# Patient Record
Sex: MALE | Race: OTHER | HISPANIC OR LATINO | URBAN - METROPOLITAN AREA
[De-identification: names, ages, dates, MRNs, and addresses within clinical notes are randomized per-mention and may not be internally consistent; named-entity substitution may affect disease eponyms.]

---

## 2021-07-27 VITALS
HEIGHT: 68 IN | RESPIRATION RATE: 15 BRPM | HEART RATE: 84 BPM | DIASTOLIC BLOOD PRESSURE: 79 MMHG | SYSTOLIC BLOOD PRESSURE: 144 MMHG | TEMPERATURE: 98 F | WEIGHT: 315 LBS | OXYGEN SATURATION: 98 %

## 2021-07-27 RX ORDER — LISINOPRIL/HYDROCHLOROTHIAZIDE 10-12.5 MG
1 TABLET ORAL
Qty: 0 | Refills: 0 | DISCHARGE

## 2021-07-27 RX ORDER — MELOXICAM 15 MG/1
1 TABLET ORAL
Qty: 0 | Refills: 0 | DISCHARGE

## 2021-07-27 RX ORDER — ASPIRIN/CALCIUM CARB/MAGNESIUM 324 MG
1 TABLET ORAL
Qty: 0 | Refills: 0 | DISCHARGE

## 2021-07-27 RX ORDER — DAPAGLIFLOZIN 10 MG/1
1 TABLET, FILM COATED ORAL
Qty: 0 | Refills: 0 | DISCHARGE

## 2021-07-27 RX ORDER — CHLORHEXIDINE GLUCONATE 213 G/1000ML
1 SOLUTION TOPICAL ONCE
Refills: 0 | Status: DISCONTINUED | OUTPATIENT
Start: 2021-07-30 | End: 2021-07-30

## 2021-07-27 RX ORDER — ATORVASTATIN CALCIUM 80 MG/1
1 TABLET, FILM COATED ORAL
Qty: 0 | Refills: 0 | DISCHARGE

## 2021-07-27 RX ORDER — SITAGLIPTIN 50 MG/1
1 TABLET, FILM COATED ORAL
Qty: 0 | Refills: 0 | DISCHARGE

## 2021-07-27 NOTE — H&P ADULT - HISTORY OF PRESENT ILLNESS
Cardiologist: Dr. Perry Levine  Escort:  Pharmacy: [facility] [phone]  COVID: Vaccinated x2 – Moderna (last dose 4/27/21)  *Verify Meds*  52 yo M with a PMH of HTN, HLD, DM, obesity, PVD w/ b/l claudication, OA in b/l knees, who presented to outpatient cardiologist Dr. Perry Levine with complaints of substernal, intermittent non-radiating chest pressure on exertion of ____ blocks over the past few weeks relieved with rest. CP is associated with EDMONDS and dizziness. Denies diaphoresis, palpitations, orthopnea/PND, abdominal pain, N/V/D, urinary sx, BRBPR, hematuria, melena, LE swelling, recent travel/sick contacts/illness. NSR 2/7/2021: moderate perfusion defect of moderate intensity in the inferior and apical regions on the stress which was reversible with rest, post stress imaging revealed LVEF 39% w/ global hypokinesis. CCTA 4/2021 (per MD note): CA score 1044 with moderate diffuse stenosis in pLAD and RCA. Echocardiogram 1/16/21: LVEF 72%, harrison MR/TR, aortic root dilation 4.3cm. In light of patient’s risk factors, CCS angina class III sx and abnormal NST/CCTA, patient is referred for cardiac catheterization with possible intervention if clinically indicated.   Cardiologist: Dr. Perry Levine  Escort:  Pharmacy: [facility] [phone]  COVID: Vaccinated x2 – Moderna (last dose 4/27/21)  52 yo M with a PMH of HTN, HLD, DM, obesity, PVD w/ b/l claudication, OA in b/l knees, who presented to outpatient cardiologist Dr. Perry Levine and per MD's note, pt with complaints of substernal, intermittent non-radiating chest pressure on exertion of blocks over the past few weeks relieved with rest. CP is associated with EDMONDS and dizziness. Denies diaphoresis, palpitations, orthopnea/PND, abdominal pain, N/V/D, urinary sx, BRBPR, hematuria, melena, LE swelling, recent travel/sick contacts/illness. NST 2/7/2021: moderate perfusion defect of moderate intensity in the inferior and apical regions on the stress which was reversible with rest, post stress imaging revealed LVEF 39% w/ global hypokinesis. CCTA 4/2021 (per MD note): CA score 1044 with moderate diffuse stenosis in pLAD and RCA. Echocardiogram 1/16/21: LVEF 72%, harrison MR/TR, aortic root dilation 4.3cm. In light of patient’s risk factors, CCS angina class III sx and abnormal NST/CCTA, patient is referred for cardiac catheterization with possible intervention if clinically indicated.

## 2021-07-27 NOTE — H&P ADULT - ASSESSMENT
50 yo M with a PMH of HTN, HLD, DM, obesity, PVD w/ b/l claudication, OA in b/l knees, who presented to outpatient cardiologist Dr. Perry Levine and per MD's note, pt with complaints Class III Anginal symptoms with subsequent NST 2/7/2021: moderate perfusion defect of moderate intensity in the inferior and apical regions on the stress which was reversible with rest, post stress imaging revealed LVEF 39% w/ global hypokinesis. CCTA 4/2021 (per MD note): CA score 1044 with moderate diffuse stenosis in pLAD and RCA. Echocardiogram 1/16/21: LVEF 72%, harrison MR/TR, aortic root dilation 4.3cm. Given pt's risk factors, CCS angina class III sx and abnormal NST/CCTA, patient is referred for cardiac catheterization with possible intervention if clinically indicated.  EKG: NSR @ 78 bpm, RBBB, no acute ischemic changes				  ASA: III			Mallampati class:II            Anginal Class: 3  Sedation Plan:  Moderate     Patient Is Suitable Candidate For Sedation: Yes    Risks & benefits of procedure and sedation and risks and benefits for the alternative therapy have been explained to the patient in layman’s terms including but not limited to: allergic reaction, bleeding, infection, arrhythmia, respiratory compromise, renal and vascular compromise, limb damage, MI, CVA, emergent CABG/Vascular Surgery and death. Informed consent obtained and in chart.  Of note:  Pt given home maintenance dose ASA 81mg and loaded with Plavix 600mg (H/H stable).   NS @ 50cc given, pt appears mildly SOB when lying flat. Lung with diminished BS likely due to body habitus.

## 2021-07-27 NOTE — H&P ADULT - NSICDXPASTMEDICALHX_GEN_ALL_CORE_FT
PAST MEDICAL HISTORY:  DM (diabetes mellitus)     HLD (hyperlipidemia)     HTN (hypertension)     PAD (peripheral artery disease)

## 2021-07-30 ENCOUNTER — OUTPATIENT (OUTPATIENT)
Dept: OUTPATIENT SERVICES | Facility: HOSPITAL | Age: 51
LOS: 1 days | Discharge: NURSING, REHAB OR LONG-TERM CARE | End: 2021-07-30
Payer: COMMERCIAL

## 2021-07-30 LAB
A1C WITH ESTIMATED AVERAGE GLUCOSE RESULT: 6.3 % — HIGH (ref 4–5.6)
ALBUMIN SERPL ELPH-MCNC: 4.2 G/DL — SIGNIFICANT CHANGE UP (ref 3.3–5)
ALP SERPL-CCNC: 81 U/L — SIGNIFICANT CHANGE UP (ref 40–120)
ALT FLD-CCNC: 16 U/L — SIGNIFICANT CHANGE UP (ref 10–45)
ANION GAP SERPL CALC-SCNC: 8 MMOL/L — SIGNIFICANT CHANGE UP (ref 5–17)
ANION GAP SERPL CALC-SCNC: 9 MMOL/L — SIGNIFICANT CHANGE UP (ref 5–17)
APTT BLD: 33.7 SEC — SIGNIFICANT CHANGE UP (ref 27.5–35.5)
APTT BLD: 44.4 SEC — HIGH (ref 27.5–35.5)
AST SERPL-CCNC: 16 U/L — SIGNIFICANT CHANGE UP (ref 10–40)
BASOPHILS # BLD AUTO: 0.07 K/UL — SIGNIFICANT CHANGE UP (ref 0–0.2)
BASOPHILS NFR BLD AUTO: 0.6 % — SIGNIFICANT CHANGE UP (ref 0–2)
BILIRUB SERPL-MCNC: 0.7 MG/DL — SIGNIFICANT CHANGE UP (ref 0.2–1.2)
BUN SERPL-MCNC: 15 MG/DL — SIGNIFICANT CHANGE UP (ref 7–23)
BUN SERPL-MCNC: 16 MG/DL — SIGNIFICANT CHANGE UP (ref 7–23)
CALCIUM SERPL-MCNC: 9.1 MG/DL — SIGNIFICANT CHANGE UP (ref 8.4–10.5)
CALCIUM SERPL-MCNC: 9.2 MG/DL — SIGNIFICANT CHANGE UP (ref 8.4–10.5)
CHLORIDE SERPL-SCNC: 104 MMOL/L — SIGNIFICANT CHANGE UP (ref 96–108)
CHLORIDE SERPL-SCNC: 108 MMOL/L — SIGNIFICANT CHANGE UP (ref 96–108)
CHOLEST SERPL-MCNC: 114 MG/DL — SIGNIFICANT CHANGE UP
CK MB CFR SERPL CALC: 1.7 NG/ML — SIGNIFICANT CHANGE UP (ref 0–6.7)
CK SERPL-CCNC: 112 U/L — SIGNIFICANT CHANGE UP (ref 30–200)
CO2 SERPL-SCNC: 24 MMOL/L — SIGNIFICANT CHANGE UP (ref 22–31)
CO2 SERPL-SCNC: 25 MMOL/L — SIGNIFICANT CHANGE UP (ref 22–31)
CREAT SERPL-MCNC: 1 MG/DL — SIGNIFICANT CHANGE UP (ref 0.5–1.3)
CREAT SERPL-MCNC: 1.05 MG/DL — SIGNIFICANT CHANGE UP (ref 0.5–1.3)
EOSINOPHIL # BLD AUTO: 0.29 K/UL — SIGNIFICANT CHANGE UP (ref 0–0.5)
EOSINOPHIL NFR BLD AUTO: 2.5 % — SIGNIFICANT CHANGE UP (ref 0–6)
ESTIMATED AVERAGE GLUCOSE: 134 MG/DL — HIGH (ref 68–114)
GLUCOSE BLDC GLUCOMTR-MCNC: 121 MG/DL — HIGH (ref 70–99)
GLUCOSE SERPL-MCNC: 115 MG/DL — HIGH (ref 70–99)
GLUCOSE SERPL-MCNC: 188 MG/DL — HIGH (ref 70–99)
HCT VFR BLD CALC: 39 % — SIGNIFICANT CHANGE UP (ref 39–50)
HCT VFR BLD CALC: 39.6 % — SIGNIFICANT CHANGE UP (ref 39–50)
HDLC SERPL-MCNC: 38 MG/DL — LOW
HGB BLD-MCNC: 13.3 G/DL — SIGNIFICANT CHANGE UP (ref 13–17)
HGB BLD-MCNC: 13.5 G/DL — SIGNIFICANT CHANGE UP (ref 13–17)
IMM GRANULOCYTES NFR BLD AUTO: 0.8 % — SIGNIFICANT CHANGE UP (ref 0–1.5)
INR BLD: 1.08 — SIGNIFICANT CHANGE UP (ref 0.88–1.16)
INR BLD: 1.16 — SIGNIFICANT CHANGE UP (ref 0.88–1.16)
LIPID PNL WITH DIRECT LDL SERPL: 47 MG/DL — SIGNIFICANT CHANGE UP
LYMPHOCYTES # BLD AUTO: 2.32 K/UL — SIGNIFICANT CHANGE UP (ref 1–3.3)
LYMPHOCYTES # BLD AUTO: 20.3 % — SIGNIFICANT CHANGE UP (ref 13–44)
MCHC RBC-ENTMCNC: 30.4 PG — SIGNIFICANT CHANGE UP (ref 27–34)
MCHC RBC-ENTMCNC: 31.3 PG — SIGNIFICANT CHANGE UP (ref 27–34)
MCHC RBC-ENTMCNC: 33.6 GM/DL — SIGNIFICANT CHANGE UP (ref 32–36)
MCHC RBC-ENTMCNC: 34.6 GM/DL — SIGNIFICANT CHANGE UP (ref 32–36)
MCV RBC AUTO: 90.3 FL — SIGNIFICANT CHANGE UP (ref 80–100)
MCV RBC AUTO: 90.6 FL — SIGNIFICANT CHANGE UP (ref 80–100)
MONOCYTES # BLD AUTO: 0.98 K/UL — HIGH (ref 0–0.9)
MONOCYTES NFR BLD AUTO: 8.6 % — SIGNIFICANT CHANGE UP (ref 2–14)
NEUTROPHILS # BLD AUTO: 7.66 K/UL — HIGH (ref 1.8–7.4)
NEUTROPHILS NFR BLD AUTO: 67.2 % — SIGNIFICANT CHANGE UP (ref 43–77)
NON HDL CHOLESTEROL: 76 MG/DL — SIGNIFICANT CHANGE UP
NRBC # BLD: 0 /100 WBCS — SIGNIFICANT CHANGE UP (ref 0–0)
NRBC # BLD: 0 /100 WBCS — SIGNIFICANT CHANGE UP (ref 0–0)
PLATELET # BLD AUTO: 261 K/UL — SIGNIFICANT CHANGE UP (ref 150–400)
PLATELET # BLD AUTO: 279 K/UL — SIGNIFICANT CHANGE UP (ref 150–400)
POTASSIUM SERPL-MCNC: 4 MMOL/L — SIGNIFICANT CHANGE UP (ref 3.5–5.3)
POTASSIUM SERPL-MCNC: 4.1 MMOL/L — SIGNIFICANT CHANGE UP (ref 3.5–5.3)
POTASSIUM SERPL-SCNC: 4 MMOL/L — SIGNIFICANT CHANGE UP (ref 3.5–5.3)
POTASSIUM SERPL-SCNC: 4.1 MMOL/L — SIGNIFICANT CHANGE UP (ref 3.5–5.3)
PROT SERPL-MCNC: 7.1 G/DL — SIGNIFICANT CHANGE UP (ref 6–8.3)
PROTHROM AB SERPL-ACNC: 12.9 SEC — SIGNIFICANT CHANGE UP (ref 10.6–13.6)
PROTHROM AB SERPL-ACNC: 13.8 SEC — HIGH (ref 10.6–13.6)
RBC # BLD: 4.32 M/UL — SIGNIFICANT CHANGE UP (ref 4.2–5.8)
RBC # BLD: 4.37 M/UL — SIGNIFICANT CHANGE UP (ref 4.2–5.8)
RBC # FLD: 13.2 % — SIGNIFICANT CHANGE UP (ref 10.3–14.5)
RBC # FLD: 13.5 % — SIGNIFICANT CHANGE UP (ref 10.3–14.5)
SODIUM SERPL-SCNC: 137 MMOL/L — SIGNIFICANT CHANGE UP (ref 135–145)
SODIUM SERPL-SCNC: 141 MMOL/L — SIGNIFICANT CHANGE UP (ref 135–145)
TRIGL SERPL-MCNC: 146 MG/DL — SIGNIFICANT CHANGE UP
WBC # BLD: 11.41 K/UL — HIGH (ref 3.8–10.5)
WBC # BLD: 12.87 K/UL — HIGH (ref 3.8–10.5)
WBC # FLD AUTO: 11.41 K/UL — HIGH (ref 3.8–10.5)
WBC # FLD AUTO: 12.87 K/UL — HIGH (ref 3.8–10.5)

## 2021-07-30 PROCEDURE — 99153 MOD SED SAME PHYS/QHP EA: CPT

## 2021-07-30 PROCEDURE — 82962 GLUCOSE BLOOD TEST: CPT

## 2021-07-30 PROCEDURE — C1725: CPT

## 2021-07-30 PROCEDURE — C1894: CPT

## 2021-07-30 PROCEDURE — 85610 PROTHROMBIN TIME: CPT

## 2021-07-30 PROCEDURE — 82550 ASSAY OF CK (CPK): CPT

## 2021-07-30 PROCEDURE — C1769: CPT

## 2021-07-30 PROCEDURE — 80061 LIPID PANEL: CPT

## 2021-07-30 PROCEDURE — C1874: CPT

## 2021-07-30 PROCEDURE — 85025 COMPLETE CBC W/AUTO DIFF WBC: CPT

## 2021-07-30 PROCEDURE — 85027 COMPLETE CBC AUTOMATED: CPT

## 2021-07-30 PROCEDURE — 93010 ELECTROCARDIOGRAM REPORT: CPT | Mod: 76

## 2021-07-30 PROCEDURE — C1887: CPT

## 2021-07-30 PROCEDURE — 85730 THROMBOPLASTIN TIME PARTIAL: CPT

## 2021-07-30 PROCEDURE — 82553 CREATINE MB FRACTION: CPT

## 2021-07-30 PROCEDURE — 80053 COMPREHEN METABOLIC PANEL: CPT

## 2021-07-30 PROCEDURE — 99152 MOD SED SAME PHYS/QHP 5/>YRS: CPT

## 2021-07-30 PROCEDURE — C1753: CPT

## 2021-07-30 PROCEDURE — 80048 BASIC METABOLIC PNL TOTAL CA: CPT

## 2021-07-30 PROCEDURE — 93005 ELECTROCARDIOGRAM TRACING: CPT

## 2021-07-30 PROCEDURE — 92978 ENDOLUMINL IVUS OCT C 1ST: CPT | Mod: LD

## 2021-07-30 PROCEDURE — 36415 COLL VENOUS BLD VENIPUNCTURE: CPT

## 2021-07-30 PROCEDURE — 93458 L HRT ARTERY/VENTRICLE ANGIO: CPT | Mod: 59

## 2021-07-30 PROCEDURE — C9600: CPT | Mod: LD

## 2021-07-30 PROCEDURE — 83036 HEMOGLOBIN GLYCOSYLATED A1C: CPT

## 2021-07-30 RX ORDER — SODIUM CHLORIDE 9 MG/ML
50 INJECTION INTRAMUSCULAR; INTRAVENOUS; SUBCUTANEOUS
Refills: 0 | Status: DISCONTINUED | OUTPATIENT
Start: 2021-07-30 | End: 2021-07-30

## 2021-07-30 RX ORDER — LISINOPRIL/HYDROCHLOROTHIAZIDE 10-12.5 MG
1 TABLET ORAL
Qty: 30 | Refills: 0
Start: 2021-07-30 | End: 2021-08-28

## 2021-07-30 RX ORDER — ASPIRIN/CALCIUM CARB/MAGNESIUM 324 MG
81 TABLET ORAL ONCE
Refills: 0 | Status: COMPLETED | OUTPATIENT
Start: 2021-07-30 | End: 2021-07-30

## 2021-07-30 RX ORDER — CLOPIDOGREL BISULFATE 75 MG/1
1 TABLET, FILM COATED ORAL
Qty: 30 | Refills: 0
Start: 2021-07-30 | End: 2021-08-28

## 2021-07-30 RX ORDER — ASPIRIN/CALCIUM CARB/MAGNESIUM 324 MG
1 TABLET ORAL
Qty: 30 | Refills: 10
Start: 2021-07-30 | End: 2022-06-24

## 2021-07-30 RX ORDER — SODIUM CHLORIDE 9 MG/ML
500 INJECTION INTRAMUSCULAR; INTRAVENOUS; SUBCUTANEOUS
Refills: 0 | Status: DISCONTINUED | OUTPATIENT
Start: 2021-07-30 | End: 2021-07-30

## 2021-07-30 RX ORDER — CLOPIDOGREL BISULFATE 75 MG/1
600 TABLET, FILM COATED ORAL ONCE
Refills: 0 | Status: COMPLETED | OUTPATIENT
Start: 2021-07-30 | End: 2021-07-30

## 2021-07-30 RX ORDER — HYDRALAZINE HCL 50 MG
5 TABLET ORAL ONCE
Refills: 0 | Status: COMPLETED | OUTPATIENT
Start: 2021-07-30 | End: 2021-07-30

## 2021-07-30 RX ORDER — CLOPIDOGREL BISULFATE 75 MG/1
1 TABLET, FILM COATED ORAL
Qty: 30 | Refills: 10
Start: 2021-07-30 | End: 2022-06-24

## 2021-07-30 RX ADMIN — SODIUM CHLORIDE 50 MILLILITER(S): 9 INJECTION INTRAMUSCULAR; INTRAVENOUS; SUBCUTANEOUS at 14:50

## 2021-07-30 RX ADMIN — CLOPIDOGREL BISULFATE 600 MILLIGRAM(S): 75 TABLET, FILM COATED ORAL at 14:48

## 2021-07-30 RX ADMIN — Medication 5 MILLIGRAM(S): at 19:20

## 2021-07-30 RX ADMIN — Medication 81 MILLIGRAM(S): at 14:48

## 2021-07-30 NOTE — PROGRESS NOTE ADULT - SUBJECTIVE AND OBJECTIVE BOX
Interventional Cardiology PA SDA Discharge Note    Patient without complaints.    Afebrile, VSS    Ext:    		  		        Right    Radial : no   hematoma,   no  bleeding, dressing; C/D/I      Pulses:    intact RAD to baseline     A/P:      52 yo M with a PMH of HTN, HLD, DM, obesity, PVD w/ b/l claudication, OA in b/l knees, who presented to outpatient cardiologist Dr. Perry Levine and per MD's note, pt with complaints of substernal, intermittent non-radiating chest pressure on exertion of blocks over the past few weeks relieved with rest. CP is associated with EDMONDS and dizziness. Denies diaphoresis, palpitations, orthopnea/PND, abdominal pain, N/V/D, urinary sx, BRBPR, hematuria, melena, LE swelling, recent travel/sick contacts/illness. NST 2/7/2021: moderate perfusion defect of moderate intensity in the inferior and apical regions on the stress which was reversible with rest, post stress imaging revealed LVEF 39% w/ global hypokinesis. CCTA 4/2021 (per MD note): CA score 1044 with moderate diffuse stenosis in pLAD and RCA. Echocardiogram 1/16/21: LVEF 72%, harrison MR/TR, aortic root dilation 4.3cm. In light of patient’s risk factors, CCS angina class III sx and abnormal NST/CCTA, patient is referred for cardiac catheterization with possible intervention if clinically indicated.    Pt. s/p cardiac cath 7/30/21: PTCA/ОЛЕГ mLAD    Home meds reviwed and pt to be discharged on ASA 81 mg daily, Plavix 75mg daily, Lipitor 40 mg daily, Lisinopril-HCTZ 10-12.5 mg daily,   DAPT sent to patients preferred pharmacy. Pt taught importance of being complaint with DAPT.   post Labs/EKG ordered for 8:15 pm; will f/u     1.	Stable for discharge as per attending  __Gavin_______ after bed rest, pt voids, wrist stable, 30 minutes of ambulation, EKG/labs stable and reviwed by PA.  2.	Follow-up with PMD/Cardiologist ___Dr. Levine________ in 1-2 weeks  3.	Discharged forms signed and copies in chart    Interventional Cardiology PA SDA Discharge Note    Patient without complaints.    Afebrile, VSS    Ext:    		  		        Right    Radial : no   hematoma,   no  bleeding, dressing; C/D/I      Pulses:    intact RAD to baseline     A/P:      52 yo M with a PMH of HTN, HLD, DM, obesity, PVD w/ b/l claudication, OA in b/l knees, who presented to outpatient cardiologist Dr. Perry Levine and per MD's note, pt with complaints of substernal, intermittent non-radiating chest pressure on exertion of blocks over the past few weeks relieved with rest. CP is associated with EDMONDS and dizziness. Denies diaphoresis, palpitations, orthopnea/PND, abdominal pain, N/V/D, urinary sx, BRBPR, hematuria, melena, LE swelling, recent travel/sick contacts/illness. NST 2/7/2021: moderate perfusion defect of moderate intensity in the inferior and apical regions on the stress which was reversible with rest, post stress imaging revealed LVEF 39% w/ global hypokinesis. CCTA 4/2021 (per MD note): CA score 1044 with moderate diffuse stenosis in pLAD and RCA. Echocardiogram 1/16/21: LVEF 72%, harrison MR/TR, aortic root dilation 4.3cm. In light of patient’s risk factors, CCS angina class III sx and abnormal NST/CCTA, patient is referred for cardiac catheterization with possible intervention if clinically indicated.    Pt. s/p cardiac cath 7/30/21: PTCA/ОЛЕГ mLAD.    Home meds reviwed and pt to be discharged on ASA 81 mg daily, Plavix 75mg daily, Lipitor 40 mg daily, Lisinopril-HCTZ 10-12.5 mg daily,   1 month DAPT and Lisinopril-HCTZ sent to [facility] as patients pharmacy closed on 7/31 (saturday); 1 year refill for DAPT sent to patients preferred pharmacy ([facility]).  post Labs/EKG ordered for 8:15 pm; will f/u     1.	Stable for discharge as per attending  __Gavin_______ after bed rest, pt voids, wrist stable, 30 minutes of ambulation, EKG/labs stable and reviwed by PA.  2.	Follow-up with PMD/Cardiologist ___Dr. Levine________ in 1-2 weeks  3.	Discharged forms signed and copies in chart    Interventional Cardiology PA SDA Discharge Note    Patient without complaints.    Afebrile, VSS    Ext:    		  		        Right    Radial : no   hematoma,   no  bleeding, dressing; C/D/I      Pulses:    intact RAD to baseline     A/P:      50 yo M with a PMH of HTN, HLD, DM, obesity, PVD w/ b/l claudication, OA in b/l knees, who presented to outpatient cardiologist Dr. Perry Levine and per MD's note, pt with complaints of substernal, intermittent non-radiating chest pressure on exertion of blocks over the past few weeks relieved with rest. CP is associated with EDMONDS and dizziness. Denies diaphoresis, palpitations, orthopnea/PND, abdominal pain, N/V/D, urinary sx, BRBPR, hematuria, melena, LE swelling, recent travel/sick contacts/illness. NST 2/7/2021: moderate perfusion defect of moderate intensity in the inferior and apical regions on the stress which was reversible with rest, post stress imaging revealed LVEF 39% w/ global hypokinesis. CCTA 4/2021 (per MD note): CA score 1044 with moderate diffuse stenosis in pLAD and RCA. Echocardiogram 1/16/21: LVEF 72%, harrison MR/TR, aortic root dilation 4.3cm. In light of patient’s risk factors, CCS angina class III sx and abnormal NST/CCTA, patient is referred for cardiac catheterization with possible intervention if clinically indicated.    Pt. s/p cardiac cath 7/30/21: PTCA/ОЛЕГ mLAD.    Home meds reviewed and pt to be discharged on ASA 81 mg daily, Plavix 75mg daily, Lipitor 40 mg daily, Lisinopril-HCTZ 10-12.5 mg daily,   1 month DAPT and Lisinopril-HCTZ sent to [facility] pharmacy as patients pharmacy closed on 7/31 (saturday); 1 year refill for DAPT sent to patients preferred pharmacy ([facility]).    Work note provided per pt's request.    1.	Stable for discharge as per attending Dr. Alonso after bed rest, pt voids, wrist stable, 30 minutes of ambulation, EKG/labs stable and reviwed by PA.  2.	Follow-up with PMD/Cardiologist Dr. Levine in 1-2 weeks  3.	Discharged forms signed and copies in chart

## 2021-08-04 DIAGNOSIS — I25.84 CORONARY ATHEROSCLEROSIS DUE TO CALCIFIED CORONARY LESION: ICD-10-CM

## 2021-08-04 DIAGNOSIS — I25.110 ATHEROSCLEROTIC HEART DISEASE OF NATIVE CORONARY ARTERY WITH UNSTABLE ANGINA PECTORIS: ICD-10-CM

## 2021-08-04 DIAGNOSIS — R94.39 ABNORMAL RESULT OF OTHER CARDIOVASCULAR FUNCTION STUDY: ICD-10-CM
